# Patient Record
Sex: MALE | Race: WHITE | Employment: FULL TIME | URBAN - METROPOLITAN AREA
[De-identification: names, ages, dates, MRNs, and addresses within clinical notes are randomized per-mention and may not be internally consistent; named-entity substitution may affect disease eponyms.]

---

## 2018-09-17 ENCOUNTER — OFFICE VISIT (OUTPATIENT)
Dept: FAMILY MEDICINE CLINIC | Facility: CLINIC | Age: 63
End: 2018-09-17
Payer: COMMERCIAL

## 2018-09-17 VITALS
WEIGHT: 173 LBS | HEART RATE: 91 BPM | BODY MASS INDEX: 23.69 KG/M2 | SYSTOLIC BLOOD PRESSURE: 130 MMHG | HEIGHT: 71.5 IN | DIASTOLIC BLOOD PRESSURE: 83 MMHG

## 2018-09-17 DIAGNOSIS — K64.4 EXTERNAL HEMORRHOID: Primary | ICD-10-CM

## 2018-09-17 PROCEDURE — 99213 OFFICE O/P EST LOW 20 MIN: CPT | Performed by: NURSE PRACTITIONER

## 2018-09-17 NOTE — PATIENT INSTRUCTIONS
Understanding Hemorrhoids    Hemorrhoid tissues are “cushions” of blood vessels that swell slightly during bowel movements. Too much pressure on the anal canal can make these tissues remain enlarged, become inflamed, and cause symptoms.  This can happen b · Internal hemorrhoids often happen in clusters around the wall of the anal canal. They are usually painless. But they may prolapse (protrude out of the anus) due to straining or pressure from hard stool.  After the bowel movement is over, they may then red Your healthcare provider may prescribe anti-inflammatory medicine to help ease your symptoms. The following tips will also help relieve pain and swelling. · Take sitz baths. Taking a sitz bath means sitting in a few inches of warm bath water.  Soaking for Drink more water  Along with a high-fiber diet, drinking more water can help ease constipation. This is because insoluble fiber absorbs water, making stools soft and bulky. Be sure to drink plenty of water throughout the day.  Drinking fruit juices, such as © 7173-2485 [employer] [address] [facility] [place] [facility] [place] [facility] [place]. All rights reserved. This information is not intended as a substitute for professional medical care. Always follow your healthcare professional's instructions.

## 2018-09-17 NOTE — PROGRESS NOTES
HPI  Pt here for rectal bleeding after moving bowels for past 3-4 days. Had some rectal pain prior to bleeding. Notices dark blood on inside of underwear. Can feel external hemorrhoid-has not tried any otc medicaitons. Denies constipation or diarrhea. Food insecurity - inability: Not on file      Transportation needs - medical: Not on file      Transportation needs - non-medical: Not on file    Occupational History      Not on file    Tobacco Use      Smoking status: Never Smoker      Smokeless toba

## 2018-09-17 NOTE — ASSESSMENT & PLAN NOTE
anusol 2.5% bid-tid prn  supporitive care discussed    Please call if symptoms worsen or are not resolving.

## 2020-04-06 ENCOUNTER — NURSE TRIAGE (OUTPATIENT)
Dept: FAMILY MEDICINE CLINIC | Facility: CLINIC | Age: 65
End: 2020-04-06

## 2020-04-06 ENCOUNTER — OFFICE VISIT (OUTPATIENT)
Dept: FAMILY MEDICINE CLINIC | Facility: CLINIC | Age: 65
End: 2020-04-06
Payer: COMMERCIAL

## 2020-04-06 ENCOUNTER — APPOINTMENT (OUTPATIENT)
Dept: LAB | Age: 65
End: 2020-04-06
Attending: FAMILY MEDICINE
Payer: COMMERCIAL

## 2020-04-06 VITALS
HEART RATE: 98 BPM | DIASTOLIC BLOOD PRESSURE: 70 MMHG | TEMPERATURE: 98 F | SYSTOLIC BLOOD PRESSURE: 113 MMHG | BODY MASS INDEX: 23.85 KG/M2 | WEIGHT: 174.19 LBS | HEIGHT: 71.5 IN

## 2020-04-06 DIAGNOSIS — H61.23 EXCESSIVE CERUMEN IN BOTH EAR CANALS: ICD-10-CM

## 2020-04-06 DIAGNOSIS — R05.9 COUGH: Primary | ICD-10-CM

## 2020-04-06 DIAGNOSIS — J02.9 ACUTE PHARYNGITIS, UNSPECIFIED ETIOLOGY: ICD-10-CM

## 2020-04-06 DIAGNOSIS — R05.9 COUGH: ICD-10-CM

## 2020-04-06 DIAGNOSIS — R68.89 CHRONIC THROAT CLEARING: ICD-10-CM

## 2020-04-06 PROBLEM — R09.89 CHRONIC THROAT CLEARING: Status: ACTIVE | Noted: 2020-04-06

## 2020-04-06 PROCEDURE — 99214 OFFICE O/P EST MOD 30 MIN: CPT | Performed by: FAMILY MEDICINE

## 2020-04-06 PROCEDURE — 86003 ALLG SPEC IGE CRUDE XTRC EA: CPT

## 2020-04-06 PROCEDURE — 82785 ASSAY OF IGE: CPT

## 2020-04-06 PROCEDURE — 36415 COLL VENOUS BLD VENIPUNCTURE: CPT

## 2020-04-06 RX ORDER — AZELASTINE 1 MG/ML
2 SPRAY, METERED NASAL 2 TIMES DAILY
Qty: 1 BOTTLE | Refills: 0 | Status: SHIPPED | OUTPATIENT
Start: 2020-04-06 | End: 2020-04-28

## 2020-04-06 NOTE — PROGRESS NOTES
Patient ID: Estefani Plasencia is a 77year old male.     HPI  Patient presents with:  Cold: since last wed cough, sinus, feels hot, fatigue  Sore Throat    Telephone message from 11:07AM today 4/6/20:  SUMMARY: Patient reports cough, sore throt and sinus drain in February and March, but his sx have persisted. He has post nasal drip. His wife tells him that he clears his throat all the time, even when he does is not having an episode of \"sickness\". Denies sinus pressure or facial pain.  Denies Hx of seasonal Cardiovascular: Negative for chest pain. Gastrointestinal: Negative for abdominal pain. Skin: Negative for color change. Allergic/Immunologic: Negative for environmental allergies. Neurological: Positive for light-headedness.  Negative for speech and all orders for this visit:    Cough  -     ALLERGEN [facility]. REG. PROF; Future  -     Azelastine HCl 0.1 % Nasal Solution; 2 sprays by Nasal route 2 (two) times daily. Squeeze nose after sprays and do not snort it back into throat.   Instructions as under the direction and in the presence of Destiny Macias DO. Electronically Signed: Rosa Seymour, 4/6/2020, 1:05 PM.      Nando CHUNG DO,  personally performed the services described in this documentation.  All medical record entries made by the

## 2020-04-06 NOTE — TELEPHONE ENCOUNTER
Office Visit  Open      4/6/2020  [facility], [facility], [address], [place], [place]   Family Medicine   Cough +2 more   Dx   Cold   , Sore Throat   Reason for Visit

## 2020-04-06 NOTE — PATIENT INSTRUCTIONS
GENERIC ALLEGRA 180 mg    Get over-the-counter Allegra 180 mg but just get the generic which is Fexofenadine 180 mg and take daily. It does not make you tired.   Take it in the morning every day for t

## 2020-04-06 NOTE — TELEPHONE ENCOUNTER
Action Requested: Summary for Provider     []  Critical Lab, Recommendations Needed  [x] Need Additional Advice  []   FYI    []   Need Orders  [] Need Medications Sent to Pharmacy  []  Other     SUMMARY: Patient reports cough, sore throt and sinus drainage

## 2020-04-28 DIAGNOSIS — R68.89 CHRONIC THROAT CLEARING: ICD-10-CM

## 2020-04-28 DIAGNOSIS — R05.9 COUGH: ICD-10-CM

## 2020-04-28 DIAGNOSIS — J02.9 ACUTE PHARYNGITIS, UNSPECIFIED ETIOLOGY: ICD-10-CM

## 2020-04-28 RX ORDER — AZELASTINE 1 MG/ML
SPRAY, METERED NASAL
Qty: 1 BOTTLE | Refills: 0 | Status: SHIPPED | OUTPATIENT
Start: 2020-04-28 | End: 2020-06-08 | Stop reason: ALTCHOICE

## 2020-06-08 ENCOUNTER — OFFICE VISIT (OUTPATIENT)
Dept: FAMILY MEDICINE CLINIC | Facility: CLINIC | Age: 65
End: 2020-06-08
Payer: COMMERCIAL

## 2020-06-08 VITALS
HEIGHT: 71.5 IN | DIASTOLIC BLOOD PRESSURE: 67 MMHG | BODY MASS INDEX: 23.69 KG/M2 | HEART RATE: 61 BPM | SYSTOLIC BLOOD PRESSURE: 107 MMHG | WEIGHT: 173 LBS | TEMPERATURE: 99 F

## 2020-06-08 DIAGNOSIS — Z23 NEED FOR VACCINATION: ICD-10-CM

## 2020-06-08 DIAGNOSIS — Z00.00 ADULT GENERAL MEDICAL EXAM: Primary | ICD-10-CM

## 2020-06-08 PROCEDURE — 90471 IMMUNIZATION ADMIN: CPT | Performed by: FAMILY MEDICINE

## 2020-06-08 PROCEDURE — 99397 PER PM REEVAL EST PAT 65+ YR: CPT | Performed by: FAMILY MEDICINE

## 2020-06-08 PROCEDURE — 90670 PCV13 VACCINE IM: CPT | Performed by: FAMILY MEDICINE

## 2020-06-08 NOTE — PROGRESS NOTES
Patient ID: Adriel Dunham is a 77year old male. HPI  Patient presents with:  Physical: Pt states he is here for a physical     Pt is  and does not smoke or drink alcohol. 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     He milo 09/09/2024    =======================================================    Lab Results   Component Value Date    WBC 8.7 06/05/2014    RBC 5.62 06/05/2014    HGB 16.6 06/05/2014    HCT 48.7 06/05/2014     06/05/2014    MPV 7.8 06/05/2014    MCV 86.6 0 RAUDEL Few (A) 06/05/2014    MICCOM Completed 06/05/2014       Lab Results   Component Value Date    CHOLEST 220 (H) 06/05/2014    TRIG 221 (H) 06/05/2014    HDL 42 06/05/2014     (H) 06/05/2014    CALCNONHDL 178 (H) 06/05/2014     TSH (S) (uIU/mL Not on file    Social Needs      Financial resource strain: Not on file      Food insecurity:        Worry: Not on file        Inability: Not on file      Transportation needs:        Medical: Not on file        Non-medical: Not on file    Tobacco Use Neck supple. No thyromegaly present. Cardiovascular: Normal rate, regular rhythm and no murmur heard. Pulmonary/Chest: Effort normal and breath sounds normal. No respiratory distress. Abdominal: Soft.  Bowel sounds are normal. There is no hepatosplenom

## 2020-06-13 ENCOUNTER — LAB ENCOUNTER (OUTPATIENT)
Dept: LAB | Age: 65
End: 2020-06-13
Attending: FAMILY MEDICINE
Payer: COMMERCIAL

## 2020-06-13 DIAGNOSIS — Z00.00 ADULT GENERAL MEDICAL EXAM: ICD-10-CM

## 2020-06-13 PROCEDURE — 80061 LIPID PANEL: CPT

## 2020-06-13 PROCEDURE — 85025 COMPLETE CBC W/AUTO DIFF WBC: CPT

## 2020-06-13 PROCEDURE — 36415 COLL VENOUS BLD VENIPUNCTURE: CPT

## 2020-06-13 PROCEDURE — 84443 ASSAY THYROID STIM HORMONE: CPT

## 2020-06-13 PROCEDURE — 80053 COMPREHEN METABOLIC PANEL: CPT

## 2021-03-15 DIAGNOSIS — Z23 NEED FOR VACCINATION: ICD-10-CM

## 2021-03-21 ENCOUNTER — IMMUNIZATION (OUTPATIENT)
Dept: LAB | Facility: HOSPITAL | Age: 66
End: 2021-03-21
Attending: HOSPITALIST
Payer: COMMERCIAL

## 2021-03-21 DIAGNOSIS — Z23 NEED FOR VACCINATION: Primary | ICD-10-CM

## 2021-03-21 PROCEDURE — 0011A SARSCOV2 VAC 100MCG/0.5ML IM: CPT

## 2021-04-18 ENCOUNTER — IMMUNIZATION (OUTPATIENT)
Dept: LAB | Facility: HOSPITAL | Age: 66
End: 2021-04-18
Attending: EMERGENCY MEDICINE
Payer: COMMERCIAL

## 2021-04-18 DIAGNOSIS — Z23 NEED FOR VACCINATION: Primary | ICD-10-CM

## 2021-04-18 PROCEDURE — 0012A SARSCOV2 VAC 100MCG/0.5ML IM: CPT

## 2021-05-04 ENCOUNTER — NURSE TRIAGE (OUTPATIENT)
Dept: FAMILY MEDICINE CLINIC | Facility: CLINIC | Age: 66
End: 2021-05-04

## 2021-05-04 NOTE — TELEPHONE ENCOUNTER
Action Requested: Summary for Provider     []  Critical Lab, Recommendations Needed  [] Need Additional Advice  []   FYI    []   Need Orders  [] Need Medications Sent to Pharmacy  []  Other     SUMMARY: Patient c/o some chest pain on Saturday while cutting

## 2021-05-07 ENCOUNTER — LAB ENCOUNTER (OUTPATIENT)
Dept: LAB | Age: 66
End: 2021-05-07
Attending: FAMILY MEDICINE
Payer: COMMERCIAL

## 2021-05-07 ENCOUNTER — OFFICE VISIT (OUTPATIENT)
Dept: FAMILY MEDICINE CLINIC | Facility: CLINIC | Age: 66
End: 2021-05-07
Payer: COMMERCIAL

## 2021-05-07 VITALS
DIASTOLIC BLOOD PRESSURE: 72 MMHG | HEIGHT: 71.5 IN | SYSTOLIC BLOOD PRESSURE: 124 MMHG | WEIGHT: 180.19 LBS | BODY MASS INDEX: 24.67 KG/M2 | TEMPERATURE: 98 F | HEART RATE: 85 BPM

## 2021-05-07 DIAGNOSIS — R07.9 CHEST PAIN AT REST: ICD-10-CM

## 2021-05-07 DIAGNOSIS — R07.9 LEFT-SIDED CHEST PAIN: Primary | ICD-10-CM

## 2021-05-07 DIAGNOSIS — R07.9 LEFT-SIDED CHEST PAIN: ICD-10-CM

## 2021-05-07 PROCEDURE — 3078F DIAST BP <80 MM HG: CPT | Performed by: FAMILY MEDICINE

## 2021-05-07 PROCEDURE — 36415 COLL VENOUS BLD VENIPUNCTURE: CPT

## 2021-05-07 PROCEDURE — 93010 ELECTROCARDIOGRAM REPORT: CPT | Performed by: FAMILY MEDICINE

## 2021-05-07 PROCEDURE — 93005 ELECTROCARDIOGRAM TRACING: CPT

## 2021-05-07 PROCEDURE — 3008F BODY MASS INDEX DOCD: CPT | Performed by: FAMILY MEDICINE

## 2021-05-07 PROCEDURE — 3074F SYST BP LT 130 MM HG: CPT | Performed by: FAMILY MEDICINE

## 2021-05-07 PROCEDURE — 84484 ASSAY OF TROPONIN QUANT: CPT

## 2021-05-07 PROCEDURE — 99214 OFFICE O/P EST MOD 30 MIN: CPT | Performed by: FAMILY MEDICINE

## 2021-05-07 NOTE — PROGRESS NOTES
Patient ID: Mono Dee is a 74year old male. HPI  Patient presents with:  Chest Pain    Last seen by me on 6/8/2020. Pt c/o chest tightness and pressure on the left chest wall. Pt was cutting the grass using a  with a bag on 5/1/2021. [place].     Wt Readings from Last 6 Encounters:  05/07/21 : 180 lb 3.2 oz  06/08/20 : 173 lb  04/06/20 : 174 lb 3.2 oz  09/17/18 : 173 lb  08/08/14 : 180 lb  07/24/14 : 179 lb      BMI Readings from Last 6 Encounters:  05/07/21 : 24.78 kg/m²  06/08/20 : 23 distress. He is not diaphoretic. Good skin color. Vitals reviewed. Assessment/Plan:      Diagnoses and all orders for this visit:    Left-sided chest pain  -     TROPONIN I; Future  Labs and EKG. He is quite stable at this time.   If any fernando

## 2021-05-08 DIAGNOSIS — R07.9 CHEST PAIN, UNSPECIFIED TYPE: Primary | ICD-10-CM

## 2021-05-25 ENCOUNTER — HOSPITAL ENCOUNTER (OUTPATIENT)
Dept: CV DIAGNOSTICS | Facility: HOSPITAL | Age: 66
End: 2021-05-25
Attending: FAMILY MEDICINE
Payer: COMMERCIAL

## 2021-05-25 ENCOUNTER — HOSPITAL ENCOUNTER (OUTPATIENT)
Dept: CV DIAGNOSTICS | Facility: HOSPITAL | Age: 66
Discharge: HOME | End: 2021-05-25
Attending: FAMILY MEDICINE
Payer: COMMERCIAL

## 2021-05-25 DIAGNOSIS — R07.9 CHEST PAIN, UNSPECIFIED TYPE: ICD-10-CM

## 2021-06-01 ENCOUNTER — LAB ENCOUNTER (OUTPATIENT)
Dept: LAB | Age: 66
End: 2021-06-01
Attending: FAMILY MEDICINE
Payer: COMMERCIAL

## 2021-06-01 DIAGNOSIS — R07.9 CHEST PAIN, UNSPECIFIED TYPE: ICD-10-CM

## 2021-06-02 ENCOUNTER — TELEPHONE (OUTPATIENT)
Dept: FAMILY MEDICINE CLINIC | Facility: CLINIC | Age: 66
End: 2021-06-02

## 2021-06-02 NOTE — TELEPHONE ENCOUNTER
Pt. states that prior Javid Oiler is needed to get Cardiac Stress Test, which is sched for Friday 6/4/2021 in [place] at 3:45pm.. Pt. Requesting for the nurse to contact [facility] prior auth dept. - phone # [phone].

## 2021-06-02 NOTE — TELEPHONE ENCOUNTER
Spoke to patient to inform him of no insurance on file. Active Insurance as of 6/2/2021    Patient has no active insurance coverage on file for 6/2/2021. Patient states he spoke to [facility] who updated insurance information.  Message sent to Angela to

## 2021-06-05 ENCOUNTER — LAB ENCOUNTER (OUTPATIENT)
Dept: LAB | Age: 66
End: 2021-06-05
Attending: FAMILY MEDICINE
Payer: COMMERCIAL

## 2021-06-05 DIAGNOSIS — Z01.818 PREOP EXAMINATION: ICD-10-CM

## 2021-06-08 ENCOUNTER — HOSPITAL ENCOUNTER (OUTPATIENT)
Dept: CV DIAGNOSTICS | Facility: HOSPITAL | Age: 66
Discharge: HOME | End: 2021-06-08
Attending: FAMILY MEDICINE
Payer: COMMERCIAL

## 2021-06-08 PROCEDURE — 93017 CV STRESS TEST TRACING ONLY: CPT | Performed by: FAMILY MEDICINE

## 2021-06-08 PROCEDURE — 93018 CV STRESS TEST I&R ONLY: CPT | Performed by: FAMILY MEDICINE

## 2021-06-14 ENCOUNTER — TELEPHONE (OUTPATIENT)
Dept: FAMILY MEDICINE CLINIC | Facility: CLINIC | Age: 66
End: 2021-06-14

## 2021-06-14 NOTE — TELEPHONE ENCOUNTER
Patient called and asked now that his Stress test is completed can he return to [facility]" activities or does Dr. Stefania Tejada want to order more tests.        Please Advise

## 2021-06-14 NOTE — TELEPHONE ENCOUNTER
Left message to call back. Please transfer to triage. 1st attempt. I have also sent patient a [employer] message to call us back.

## 2021-06-14 NOTE — TELEPHONE ENCOUNTER
He should be able to return to regular activities. If he starts feeling short of breath or having any chest pain please let us know or go to the emergency room.

## 2022-01-13 ENCOUNTER — IMMUNIZATION (OUTPATIENT)
Dept: LAB | Facility: HOSPITAL | Age: 67
End: 2022-01-13
Attending: EMERGENCY MEDICINE
Payer: COMMERCIAL

## 2022-01-13 DIAGNOSIS — Z23 NEED FOR VACCINATION: Primary | ICD-10-CM

## 2022-01-13 PROCEDURE — 0064A SARSCOV2 VAC 50MCG/0.25ML IM: CPT

## 2022-11-10 ENCOUNTER — NURSE TRIAGE (OUTPATIENT)
Dept: FAMILY MEDICINE CLINIC | Facility: CLINIC | Age: 67
End: 2022-11-10

## 2022-11-11 ENCOUNTER — OFFICE VISIT (OUTPATIENT)
Dept: FAMILY MEDICINE CLINIC | Facility: CLINIC | Age: 67
End: 2022-11-11
Payer: COMMERCIAL

## 2022-11-11 VITALS
SYSTOLIC BLOOD PRESSURE: 123 MMHG | BODY MASS INDEX: 24.65 KG/M2 | TEMPERATURE: 99 F | WEIGHT: 180 LBS | DIASTOLIC BLOOD PRESSURE: 76 MMHG | HEART RATE: 74 BPM | HEIGHT: 71.5 IN

## 2022-11-11 DIAGNOSIS — B34.9 VIRAL SYNDROME: Primary | ICD-10-CM

## 2022-11-11 DIAGNOSIS — R05.1 ACUTE COUGH: ICD-10-CM

## 2022-11-11 DIAGNOSIS — M79.10 MYALGIA: ICD-10-CM

## 2022-11-11 PROCEDURE — 3078F DIAST BP <80 MM HG: CPT | Performed by: FAMILY MEDICINE

## 2022-11-11 PROCEDURE — 99213 OFFICE O/P EST LOW 20 MIN: CPT | Performed by: FAMILY MEDICINE

## 2022-11-11 PROCEDURE — 3008F BODY MASS INDEX DOCD: CPT | Performed by: FAMILY MEDICINE

## 2022-11-11 PROCEDURE — 3074F SYST BP LT 130 MM HG: CPT | Performed by: FAMILY MEDICINE

## 2022-11-11 RX ORDER — PROMETHAZINE HYDROCHLORIDE AND CODEINE PHOSPHATE 6.25; 1 MG/5ML; MG/5ML
5 SYRUP ORAL EVERY 6 HOURS PRN
Qty: 180 ML | Refills: 0 | Status: SHIPPED | OUTPATIENT
Start: 2022-11-11

## 2022-11-11 NOTE — TELEPHONE ENCOUNTER
Advised patient of Dr. Renate Wadsworth note. Patient verbalized understanding and appointment booked. Patient stated that second COVID test was negative as well.   Future Appointments   Date Time Provider Pablo Damon   11/11/2022  1:15 PM [facility]

## 2022-11-11 NOTE — TELEPHONE ENCOUNTER
Patient stated that last night had a fever of 101-102F, thinks maybe the fever broke since does not have a fever currently. Wanted to verify if should keep appointment today with Dr Brittany Torre today. Advised patient that during the day might not have a fever but possibly could return in the evening, to still keep appointment with Dr Brittany Torre. Patient agreed.

## 2024-08-09 ENCOUNTER — TELEPHONE (OUTPATIENT)
Facility: CLINIC | Age: 69
End: 2024-08-09

## 2024-08-09 NOTE — TELEPHONE ENCOUNTER
----- Message from Silvia ROBERTSON sent at 9/22/2015 10:19 AM CDT -----  Regarding: Recall colon   Recall patient for colon in 10 years per CB.

## 2024-10-18 ENCOUNTER — OFFICE VISIT (OUTPATIENT)
Dept: FAMILY MEDICINE CLINIC | Facility: CLINIC | Age: 69
End: 2024-10-18
Payer: COMMERCIAL

## 2024-10-18 VITALS
WEIGHT: 162 LBS | DIASTOLIC BLOOD PRESSURE: 74 MMHG | HEIGHT: 71.5 IN | HEART RATE: 88 BPM | SYSTOLIC BLOOD PRESSURE: 105 MMHG | TEMPERATURE: 97 F | BODY MASS INDEX: 22.18 KG/M2

## 2024-10-18 DIAGNOSIS — R42 EPISODIC LIGHTHEADEDNESS: ICD-10-CM

## 2024-10-18 DIAGNOSIS — R13.19 ESOPHAGEAL DYSPHAGIA: ICD-10-CM

## 2024-10-18 DIAGNOSIS — Z12.11 SCREENING FOR COLON CANCER: ICD-10-CM

## 2024-10-18 DIAGNOSIS — R09.89 CHRONIC THROAT CLEARING: Primary | ICD-10-CM

## 2024-10-18 DIAGNOSIS — K21.9 GASTROESOPHAGEAL REFLUX DISEASE, UNSPECIFIED WHETHER ESOPHAGITIS PRESENT: ICD-10-CM

## 2024-10-18 PROCEDURE — 99214 OFFICE O/P EST MOD 30 MIN: CPT | Performed by: FAMILY MEDICINE

## 2024-10-18 NOTE — PROGRESS NOTES
Patient ID: Michael Sabillon is a 69 year old male.    HPI  Chief Complaint   Patient presents with    Throat Problem    Lightheadedness     He states he has always had issues with swallowing capsules or large tablets.  He states he just had some dental work done and the amoxicillin tablets were very large and he feels that they get stuck to his throat as he is trying to swallow them.  He states he has to drink water or eat some food to get it down.  There is no vomiting.  His appetite is fine.  He has lost weight voluntarily by cutting off sweets.  He is due for a colonoscopy as he had 1 in 2014 but he will need an EGD as well.  He states he feels like there is a pouch in the esophagus and food can get stuck in it and then come back out.  He does not have any older or halitosis.  His wife states he clears his throat very often but he does not realize it.  Again he states his appetite is very good.    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  There is no chest pain or palpitations because of this.  It will go away if he moves about.    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     Wt Readings from Last 6 Encounters:   10/18/24 162 lb (73.5 kg)   11/11/22 180 lb (81.6 kg)   05/07/21 180 lb 3.2 oz (81.7 kg)   06/08/20 173 lb (78.5 kg)   04/06/20 174 lb 3.2 oz (79 kg)   09/17/18 173 lb (78.5 kg)       BMI Readings from Last 6 Encounters:   10/18/24 22.28 kg/m²   11/11/22 24.76 kg/m²   05/07/21 24.78 kg/m²   06/08/20 23.79 kg/m²   04/06/20 23.96 kg/m²   09/17/18 23.79 kg/m²       BP Readings from Last 6 Encounters:   10/18/24 105/74   11/11/22 123/76   05/07/21 124/72   06/08/20 107/67   04/06/20 113/70   09/17/18 130/83         Review of Systems  See HPI      Medical History:      History reviewed. No pertinent past medical history.    Past Surgical History:   Procedure  Laterality Date    Tonsillectomy            No current outpatient medications on file.     Allergies:Allergies[1]     Physical Exam:       Physical Exam  Blood pressure 105/74, pulse 88, temperature 97.3 °F (36.3 °C), temperature source Temporal, height 5' 11.5\" (1.816 m), weight 162 lb (73.5 kg).  Physical Exam   Constitutional: Patient is oriented to person, place, and time. Patient appears well-developed and well-nourished. No distress.   HENT:   Head: Normocephalic.   Right Ear: Tympanic membrane, external ear and ear canal normal.   Left Ear: Tympanic membrane, external ear and ear canal normal.   THROAT: Oropharynx is clear without exudate.  Tonsils surgically absent.  No obstruction seen.  No halitosis.  Eyes: Conjunctivae and EOM are normal.   Neck: Normal range of motion. No thyromegaly present.   Cardiovascular: Normal rate, regular rhythm and normal heart sounds.    Pulmonary/Chest: Effort normal and breath sounds normal. No respiratory distress.   Lymphadenopathy:     Patient has  no cervical adenopathy.   Neurological: Patient is alert and oriented to person, place, and time.  Speaking normally.  Gait is normal.  Skin: Skin is warm.  No pallor or diaphoresis.  Psychiatry: Normal mood and affect   Abdominal: Normal appearance and bowel sounds are normal. There is    no tenderness.  There is no rigidity, no rebound, no guarding.    Vitals reviewed.           Assessment/Plan:      Diagnoses and all orders for this visit:    Chronic throat clearing  -     GASTRO - INTERNAL  Needs to see gastroenterology and will need an EGD and colonoscopy.  Episodic lightheadedness  I explained that blood pools in the legs if you are standing in 1 place for long time.  He needs to wiggle his toes or do some toe raises while standing in [employer] and singing.  He can also get compression socks that go up to the knee and see if that helps.  Make sure to be hydrated.  Esophageal dysphagia  -     GASTRO - INTERNAL    Screening  for colon cancer  -     GASTRO - INTERNAL    Gastroesophageal reflux disease, unspecified whether esophagitis present  He states this only happened if he ate a large dessert after dinner and then went to bed but since he stopped eating sweets he has not had any acid reflux.      Referrals (if applicable)  Orders Placed This Encounter   Procedures    GASTRO - INTERNAL     Gastroenterology Department phone number is [phone].  ++++ Ask for first available provider that can see you ++++.     Referral Priority:   Routine     Referral Type:   OFFICE VISIT     Requested Specialty:   GASTROENTEROLOGY     Number of Visits Requested:   3         Follow up if symptoms persist.  Take medicine (if given) as prescribed.  Approach to treatment discussed and patient/family member understands and agrees to plan.     No follow-ups on file.        Nando Jacinto DO  10/18/2024       [1] No Known Allergies

## (undated) NOTE — LETTER
8/9/2024    Michael Sabillon        [address] [place] [address]            Dear Michael Sabillon,      Our records indicate that you are due for an appointment for a Colonoscopy with Saleem Diaz MD. Our doctors are booking out about 3-6 months in advance for procedures.     Please call our office to schedule a phone screening appointment to plan for the procedure(s).   Your medical well-being is important to us.    If your insurance requires a referral, please call your primary care office to request one.      Thank you,      The Physicians and Staff at [facility]